# Patient Record
Sex: MALE | Race: WHITE | NOT HISPANIC OR LATINO | Employment: UNEMPLOYED | URBAN - METROPOLITAN AREA
[De-identification: names, ages, dates, MRNs, and addresses within clinical notes are randomized per-mention and may not be internally consistent; named-entity substitution may affect disease eponyms.]

---

## 2017-02-18 ENCOUNTER — OFFICE VISIT (OUTPATIENT)
Dept: URGENT CARE | Facility: URGENT CARE | Age: 22
End: 2017-02-18
Payer: COMMERCIAL

## 2017-02-18 VITALS
OXYGEN SATURATION: 99 % | WEIGHT: 141.6 LBS | HEART RATE: 79 BPM | SYSTOLIC BLOOD PRESSURE: 124 MMHG | DIASTOLIC BLOOD PRESSURE: 63 MMHG

## 2017-02-18 DIAGNOSIS — H65.192 OTHER ACUTE NONSUPPURATIVE OTITIS MEDIA OF LEFT EAR, RECURRENCE NOT SPECIFIED: Primary | ICD-10-CM

## 2017-02-18 DIAGNOSIS — M22.2X2 PATELLOFEMORAL STRESS SYNDROME OF LEFT KNEE: ICD-10-CM

## 2017-02-18 DIAGNOSIS — M76.62 ACHILLES TENDINITIS OF LEFT LOWER EXTREMITY: ICD-10-CM

## 2017-02-18 PROCEDURE — 99214 OFFICE O/P EST MOD 30 MIN: CPT | Performed by: FAMILY MEDICINE

## 2017-02-18 RX ORDER — AMOXICILLIN 875 MG
875 TABLET ORAL 2 TIMES DAILY
Qty: 20 TABLET | Refills: 0 | Status: SHIPPED | OUTPATIENT
Start: 2017-02-18 | End: 2017-02-28

## 2017-02-18 ASSESSMENT — PAIN SCALES - GENERAL: PAINLEVEL: MODERATE PAIN (4)

## 2017-02-18 NOTE — PATIENT INSTRUCTIONS
Knee Pain of Uncertain Cause    There are several common causes for knee pain. These can include:    A sprain of the ligaments that support the joint    An injury to the cartilage lining of the joint    Arthritis from wear-and-tear or inflammation  There are other causes as well. There may also be swelling, reduced movement of the knee joint, and pain with walking. A definite diagnosis will still need to be made. If your symptoms do not improve, further follow-up and testing may be needed.  Home care    Stay off the injured leg as much as possible until pain improves.    Apply an ice pack over the injured area for 15 to 20 minutes every 3 to 6 hours. You should do this for the first 24 to 48 hours. You can make an ice pack by filling a plastic bag that seals at the top with ice cubes and then wrapping it with a thin towel. Continue to use ice packs for relief of pain and swelling as needed. As the ice melts, be careful to avoid getting your wrap, splint, or cast wet. After 48 hours, apply heat (warm shower or warm bath) for 15 to 20 minutes several times a day, or alternate ice and heat. If you have to wear a hook-and-loop knee brace, you can open it to apply the ice pack, or heat, directly to the knee. Never put ice directly on the skin. Always wrap the ice in a towel or other type of cloth.    You may use over-the-counter pain medicine to control pain, unless another pain medicine was prescribed. If you have chronic liver or kidney disease or ever had a stomach ulcer or GI bleeding, talk with your healthcare provider using these medicines.    If crutches or a walker have been recommended, do not put weight on the injured leg until you can do so without pain. Check with your healthcare provider before returning to sports or full work duties.    If you have a hook-and-loop knee brace, you can remove it to bathe and sleep, unless told otherwise.  Follow-up care  Follow up with your healthcare provider as advised.  This is usually within 1-2 weeks.  If X-rays were taken, you will be told of any new findings that may affect your care.  Call 911  Call 911 if you have:     Shortness of breath    Chest pain  When to seek medical advice  Call your healthcare provider right away if any of these occur:    Toes or foot becomes swollen, cold, blue, numb, or tingly    Pain or swelling spreads over the knee or calf    Warmth or redness appears over the knee or calf    Other joints become painful    Rash appears    Fever of 100.4 F (38 C) or above lasting for 24 to 48 hours    2039-7408 [employer]. [address] [place], [place] [address]. All rights reserved. This information is not intended as a substitute for professional medical care. Always follow your healthcare professional's instructions.

## 2017-02-18 NOTE — PROGRESS NOTES
SUBJECTIVE:                                                    Cliff Terrzaas is a 21 year old male who presents to clinic today for the following health issues:      Musculoskeletal problem/pain      Duration: [id number]    Description  Location: Left pain in ankle and knee, hurts the most when he wakes up 8/10    Intensity:  4/10    Accompanying signs and symptoms: swelling    History  Previous similar problem: no   Previous evaluation:  none    Precipitating or alleviating factors:  Trauma or overuse: YES  Aggravating factors include: lifting    Therapies tried and outcome: rest/inactivity, heat, ice, immobilization, stretching and NSAID -      A month ago when he was playing basketball or with lifting he's been increasing his activities lately started noticing more pain  Ankle really is what's bothering him  The knee stiffness is more than pain and having hard time putting more weight on    Still continues to work out but cut down on leg workouts in the past month  Still playing basketball and skating.    Really sore in the morning and seems to get better throughout the day.     Also complained of a mild left ear pain  Patient has been coughing up some phlegm past 3-4 days  No fevers or chills chest pain or shortness of breath   Some headache on and off   These uri signs or symptoms are mild and patient just thought to mention while he's here  Problem list and histories reviewed & adjusted, as indicated.  Additional history: as documented    Problem list, Medication list, Allergies, and Medical/Social/Surgical histories reviewed in EPIC and updated as appropriate.    ROS:  Constitutional, HEENT, cardiovascular, pulmonary, gi and gu systems are negative, except as otherwise noted.    OBJECTIVE:                                                    /63 (BP Location: Left arm, Patient Position: Chair, Cuff Size: Adult Regular)  Pulse 79  Wt 141 lb 9.6 oz (64.2 kg)  SpO2 99%  There is no height or weight  on file to calculate BMI.  GENERAL: healthy, alert and no distress  EYES: pink palpebral conjunctiva, anicteric sclera, pupils equally reactive to light and accomodation, extraocular muscles intact full and equal.  ENT: midline nasal septum, positive  nasal congestion   Left ear:no tragal tenderness, no mastoid tenderness erythematous and bulging tympaninc membrane   Right ear: no tragal tenderness, no mastoid tenderness normal tympaninc membrane   NECK: no adenopathy, no asymmetry or  masses  RESP: lungs clear to auscultation - no rales, rhonchi or wheezes  CV: regular rate and rhythm, normal S1 S2, no S3 or S4, no murmur, click or rub, no peripheral edema and peripheral pulses strong  ABDOMEN: soft, nontender, no hepatosplenomegaly, no masses and bowel sounds normal  MS: no gross musculoskeletal defects noted, no edema  Tender left achilles tendon but appears intact   Ankle Exam (left):  Inspection:no swelling seen  Palpation: no tenderness on navicular bone or 5th metatarsal. No proximal fibular tenderness. No calf tenderness. Achilles tendon is intact  Cap refill intact.    Good doralis pedis.  Neurovascularly Intact Distally.   Left knee: good range of motion. Tender anterior patellar tendon area positive apprehension test  Cruciate and collateral ligament intact negative jued  NEURO: Normal strength and tone, mentation intact and speech normal    Diagnostic Test Results:  No results found for this or any previous visit (from the past 24 hour(s)).     ASSESSMENT/PLAN:                                                      No diagnosis found.    ICD-10-CM    1. Other acute nonsuppurative otitis media of left ear, recurrence not specified H65.192 amoxicillin (AMOXIL) 875 MG tablet   2. Achilles tendinitis of left lower extremity M76.62 ORTHO  REFERRAL   3. Patellofemoral stress syndrome of left knee M22.2X2 ORTHO  REFERRAL       Recommend follow up with primary care provider if no relief of  ear pain in 3 days, sooner if worse  Needs ear recheck with primary care provider in 2-4 weeks  Adverse reactions of medications discussed.  Over the counter medications discussed.   Aware to come back in if with worsening symptoms or if no relief despite treatment plan  Patient voiced understanding and had no further questions.     ASO brace  Activity modification  See patient instructions  Also given handout from [employer]  Follow up ortho   Alarm signs or symptoms discussed, if present recommend go to ER     MD Yolanda Uribe MD  [facility]

## 2017-02-18 NOTE — MR AVS SNAPSHOT
After Visit Summary   2/18/2017    Cliff Terrazas    MRN: [id number]           Patient Information     Date Of Birth          1995        Visit Information        Provider Department      2/18/2017 1:30 PM Yolanda King MD [facility]        Today's Diagnoses     Other acute nonsuppurative otitis media of left ear, recurrence not specified    -  1    Achilles tendinitis of left lower extremity        Patellofemoral stress syndrome of left knee          Care Instructions      Knee Pain of Uncertain Cause    There are several common causes for knee pain. These can include:    A sprain of the ligaments that support the joint    An injury to the cartilage lining of the joint    Arthritis from wear-and-tear or inflammation  There are other causes as well. There may also be swelling, reduced movement of the knee joint, and pain with walking. A definite diagnosis will still need to be made. If your symptoms do not improve, further follow-up and testing may be needed.  Home care    Stay off the injured leg as much as possible until pain improves.    Apply an ice pack over the injured area for 15 to 20 minutes every 3 to 6 hours. You should do this for the first 24 to 48 hours. You can make an ice pack by filling a plastic bag that seals at the top with ice cubes and then wrapping it with a thin towel. Continue to use ice packs for relief of pain and swelling as needed. As the ice melts, be careful to avoid getting your wrap, splint, or cast wet. After 48 hours, apply heat (warm shower or warm bath) for 15 to 20 minutes several times a day, or alternate ice and heat. If you have to wear a hook-and-loop knee brace, you can open it to apply the ice pack, or heat, directly to the knee. Never put ice directly on the skin. Always wrap the ice in a towel or other type of cloth.    You may use over-the-counter pain medicine to control pain, unless another pain medicine was  prescribed. If you have chronic liver or kidney disease or ever had a stomach ulcer or GI bleeding, talk with your healthcare provider using these medicines.    If crutches or a walker have been recommended, do not put weight on the injured leg until you can do so without pain. Check with your healthcare provider before returning to sports or full work duties.    If you have a hook-and-loop knee brace, you can remove it to bathe and sleep, unless told otherwise.  Follow-up care  Follow up with your healthcare provider as advised. This is usually within 1-2 weeks.  If X-rays were taken, you will be told of any new findings that may affect your care.  Call 911  Call 911 if you have:     Shortness of breath    Chest pain  When to seek medical advice  Call your healthcare provider right away if any of these occur:    Toes or foot becomes swollen, cold, blue, numb, or tingly    Pain or swelling spreads over the knee or calf    Warmth or redness appears over the knee or calf    Other joints become painful    Rash appears    Fever of 100.4 F (38 C) or above lasting for 24 to 48 hours    9933-4017 [employer] [address] [place] [address]. All rights reserved. This information is not intended as a substitute for professional medical care. Always follow your healthcare professional's instructions.              Follow-ups after your visit        Additional Services     ORTHO  REFERRAL       [facility] is referring you to the Orthopedic  Services at [facility].       The  Representative will assist you in the coordination of your Orthopedic and Musculoskeletal Care as prescribed by your physician.    The  Representative will call you within 1 business day to help schedule your appointment, or you may contact the  Representative at:    All areas ~ [phone]     Type of Referral : Non Surgical       Timeframe  "requested: Within 2 weeks    Coverage of these services is subject to the terms and limitations of your health insurance plan.  Please call member services at your health plan with any benefit or coverage questions.      If X-rays, CT or MRI's have been performed, please contact the facility where they were done to arrange for , prior to your scheduled appointment.  Please bring this referral request to your appointment and present it to your specialist.                  Who to contact     If you have questions or need follow up information about today's clinic visit or your schedule please contact [facility] directly at [phone].  Normal or non-critical lab and imaging results will be communicated to you by MyChart, letter or phone within 4 business days after the clinic has received the results. If you do not hear from us within 7 days, please contact the clinic through [employer] or phone. If you have a critical or abnormal lab result, we will notify you by phone as soon as possible.  Submit refill requests through [employer] or call your pharmacy and they will forward the refill request to us. Please allow 3 business days for your refill to be completed.          Additional Information About Your Visit        MyChart Information     [employer] lets you send messages to your doctor, view your test results, renew your prescriptions, schedule appointments and more. To sign up, go to [email or web address] . Click on \"Log in\" on the left side of the screen, which will take you to the Welcome page. Then click on \"Sign up Now\" on the right side of the page.     You will be asked to enter the access code listed below, as well as some personal information. Please follow the directions to create your username and password.     Your access code is: [email or web address]  Expires: 2017  3:13 PM     Your access code will  in 90 days. If you need help or a new code, please call your [facility] or " [phone].        Care EveryWhere ID     This is your Care EveryWhere ID. This could be used by other organizations to access your [place] medical records  [id number]        Your Vitals Were     Pulse Pulse Oximetry                79 99%           Blood Pressure from Last 3 Encounters:   02/18/17 124/63   07/23/12 102/68   04/19/12 117/67    Weight from Last 3 Encounters:   02/18/17 141 lb 9.6 oz (64.2 kg) (27 %)*   07/23/12 109 lb (49.4 kg) (18 %)*   04/19/12 110 lb (49.9 kg) (24 %)*     * Growth percentiles are based on [facility] 2-20 Years data.              We Performed the Following     ORTHO  REFERRAL          Today's Medication Changes          These changes are accurate as of: 2/18/17  3:13 PM.  If you have any questions, ask your nurse or doctor.               Start taking these medicines.        Dose/Directions    amoxicillin 875 MG tablet   Commonly known as:  AMOXIL   Used for:  Other acute nonsuppurative otitis media of left ear, recurrence not specified   Started by:  Yolanda King MD        Dose:  875 mg   Take 1 tablet (875 mg) by mouth 2 times daily for 10 days   Quantity:  20 tablet   Refills:  0            Where to get your medicines      These medications were sent to [facility]/pharmacy #[id number] - [address],  AT [address] OF [address], , [facility] [address]     Phone:  [phone]     amoxicillin 875 MG tablet                Primary Care Provider Office Phone # Fax #    Agustín Alatorre -[phone]       [facility] [address] [facility] [address]        Thank you!     Thank you for choosing [facility]  for your care. Our goal is always to provide you with excellent care. Hearing back from our patients is one way we can continue to improve our services. Please take a few minutes to complete the written survey that you may receive in the mail after your visit with us.  Thank you!             Your Updated Medication List - Protect others around you: Learn how to safely use, store and throw away your medicines at www.disposemymeds.org.          This list is accurate as of: 2/18/17  3:13 PM.  Always use your most recent med list.                   Brand Name Dispense Instructions for use    amoxicillin 875 MG tablet    AMOXIL    20 tablet    Take 1 tablet (875 mg) by mouth 2 times daily for 10 days       ibuprofen 200 MG tablet    ADVIL/MOTRIN     Take 400 mg by mouth every 4 hours as needed Reported on 2/18/2017

## 2017-02-18 NOTE — NURSING NOTE
"Chief Complaint   Patient presents with     Knee Pain     knee and ankle pain X1mo hurt it during basketball. Hurts the most when he wakes up     Otalgia     left ear pain X1day.        Initial /63 (BP Location: Left arm, Patient Position: Chair, Cuff Size: Adult Regular)  Pulse 79  Wt 141 lb 9.6 oz (64.2 kg)  SpO2 99% Estimated body mass index is 17.59 kg/(m^2) as calculated from the following:    Height as of 7/23/12: 5' 6\" (1.676 m).    Weight as of 7/23/12: 109 lb (49.4 kg).  Medication Reconciliation: incomplete   Sarai Moulton MA      "

## 2017-02-21 ENCOUNTER — OFFICE VISIT (OUTPATIENT)
Dept: ORTHOPEDICS | Facility: CLINIC | Age: 22
End: 2017-02-21
Payer: COMMERCIAL

## 2017-02-21 VITALS
SYSTOLIC BLOOD PRESSURE: 124 MMHG | WEIGHT: 157 LBS | DIASTOLIC BLOOD PRESSURE: 56 MMHG | HEART RATE: 79 BPM | HEIGHT: 66 IN | BODY MASS INDEX: 25.23 KG/M2

## 2017-02-21 DIAGNOSIS — M76.62 LEFT ACHILLES TENDINITIS: ICD-10-CM

## 2017-02-21 DIAGNOSIS — M76.52 PATELLAR TENDINITIS OF LEFT KNEE: Primary | ICD-10-CM

## 2017-02-21 PROCEDURE — 97110 THERAPEUTIC EXERCISES: CPT | Mod: GP | Performed by: PHYSICAL MEDICINE & REHABILITATION

## 2017-02-21 PROCEDURE — 99244 OFF/OP CNSLTJ NEW/EST MOD 40: CPT | Mod: 25 | Performed by: PHYSICAL MEDICINE & REHABILITATION

## 2017-02-21 RX ORDER — MELOXICAM 15 MG/1
15 TABLET ORAL DAILY
Qty: 30 TABLET | Refills: 0 | Status: SHIPPED | OUTPATIENT
Start: 2017-02-21 | End: 2017-03-31

## 2017-02-21 NOTE — MR AVS SNAPSHOT
After Visit Summary   2/21/2017    Cliff Terrazas    MRN: [id number]           Patient Information     Date Of Birth          1995        Visit Information        Provider Department      2/21/2017 7:00 PM Aspen Conde MD [facility]        Today's Diagnoses     Patellar tendinitis of left knee    -  1    Left Achilles tendinitis          Care Instructions    -Meloxicam ordered.  Take 15 mg daily.  Please take this medication with food.  DO NOT take any other nonsteroidal anti-inflammatory drugs (NSAIDs) such as Advil, ibuprofen, Aleve, naproxen, etc while on this medication.  -Recommend utilizing the Chopat strap during activity for the patellar tendinitis.  -Recommend wearing the night splint for the Achilles tendinitis.    -Please ice massage the patellar tendon and Achilles tendon for 15 minutes at the end of the day.  -Tylenol as needed for breakthrough pain.  Maximum 3000 mg in 24 hours.  -Home exercises provided.  Please do the exercises 5-6 days per week.  -Avoid activities that aggravate the pain.    -Follow up in one month.  If no better at that point, we will consider formal physical therapy.          Follow-ups after your visit        Who to contact     If you have questions or need follow up information about today's clinic visit or your schedule please contact [facility] directly at [phone].  Normal or non-critical lab and imaging results will be communicated to you by MyChart, letter or phone within 4 business days after the clinic has received the results. If you do not hear from us within 7 days, please contact the clinic through MyChart or phone. If you have a critical or abnormal lab result, we will notify you by phone as soon as possible.  Submit refill requests through [employer] or call your pharmacy and they will forward the refill request to us. Please allow 3 business days for your  "refill to be completed.          Additional Information About Your Visit        [employer] Information     [employer] lets you send messages to your doctor, view your test results, renew your prescriptions, schedule appointments and more. To sign up, go to [email or web address] . Click on \"Log in\" on the left side of the screen, which will take you to the Welcome page. Then click on \"Sign up Now\" on the right side of the page.     You will be asked to enter the access code listed below, as well as some personal information. Please follow the directions to create your username and password.     Your access code is: [email or web address]  Expires: 2017  3:13 PM     Your access code will  in 90 days. If you need help or a new code, please call your [place] clinic or [phone].        Care EveryWhere ID     This is your Care EveryWhere ID. This could be used by other organizations to access your [place] medical records  [id number]        Your Vitals Were     Pulse Height BMI (Body Mass Index)             79 5' 6\" (1.676 m) 25.34 kg/m2          Blood Pressure from Last 3 Encounters:   17 124/56   17 124/63   12 102/68    Weight from Last 3 Encounters:   17 157 lb (71.2 kg) (53 %)*   17 141 lb 9.6 oz (64.2 kg) (27 %)*   12 109 lb (49.4 kg) (18 %)*     * Growth percentiles are based on CDC 2-20 Years data.              Today, you had the following     No orders found for display         Today's Medication Changes          These changes are accurate as of: 17  7:54 PM.  If you have any questions, ask your nurse or doctor.               Start taking these medicines.        Dose/Directions    meloxicam 15 MG tablet   Commonly known as:  MOBIC   Used for:  Patellar tendinitis of left knee, Left Achilles tendinitis        Dose:  15 mg   Take 1 tablet (15 mg) by mouth daily   Quantity:  30 tablet   Refills:  0            Where to get your medicines      These medications were sent to " [facility]/pharmacy [id number] - [place], [place] - [address] [facility] AT [address] OF [facility]  [address] [facility] [address], [facility] [address]     Phone:  [phone]     meloxicam 15 MG tablet                Primary Care Provider Office Phone # Fax #    Agustín Alatorre -[phone]       [facility] [address] [facility] [address]        Thank you!     Thank you for choosing [facility]  for your care. Our goal is always to provide you with excellent care. Hearing back from our patients is one way we can continue to improve our services. Please take a few minutes to complete the written survey that you may receive in the mail after your visit with us. Thank you!             Your Updated Medication List - Protect others around you: Learn how to safely use, store and throw away your medicines at www.disposemymeds.org.          This list is accurate as of: 2/21/17  7:54 PM.  Always use your most recent med list.                   Brand Name Dispense Instructions for use    amoxicillin 875 MG tablet    AMOXIL    20 tablet    Take 1 tablet (875 mg) by mouth 2 times daily for 10 days       ibuprofen 200 MG tablet    ADVIL/MOTRIN     Take 400 mg by mouth every 4 hours as needed Reported on 2/18/2017       meloxicam 15 MG tablet    MOBIC    30 tablet    Take 1 tablet (15 mg) by mouth daily

## 2017-02-22 NOTE — NURSING NOTE
"Chief Complaint   Patient presents with     Leg Pain     left achilles       Initial /56  Pulse 79  Ht 5' 6\" (1.676 m)  Wt 157 lb (71.2 kg)  BMI 25.34 kg/m2 Estimated body mass index is 25.34 kg/(m^2) as calculated from the following:    Height as of this encounter: 5' 6\" (1.676 m).    Weight as of this encounter: 157 lb (71.2 kg).  Medication Reconciliation: complete     Lorie Ruelas M.Ed., ATC      "

## 2017-02-22 NOTE — NURSING NOTE
Patient was given home exercise program at the [facility]  that included the following exercise(s) :    Exercise(s) active supine quad stretch off table, prone quad stretch, hip flexor lunge stretch, supine across the body glute stretch, seated kne to chest glute stretch, seated external rotation glute stretch with trunk flexion    Repititions: 10 seconds x 6    Times per day: 1-2 times per day    Patient demonstrated understanding of and the ability to perform these exercises. Patient was seen for 10 minutes to provide this home exercise program. Patient was directed to discontinue any exercises that cause pain, and to call the clinic if any questions. All patient questions were answered.

## 2017-02-22 NOTE — PROGRESS NOTES
Sports Medicine Clinic Visit    PCP: Agustín Alatorre    CC: Patient presents with:  Leg Pain: left achilles      HPI:  Cliff Terrazas is a 21 year old male who is seen in consultation at the request of Yolanda King MD. He notes pain that began 1 month ago, insidious onset. Pain is located on the left knee, inferior pole of the patella. He rates the pain at a 8/10 at its worst and a 6/10 currently. Symptoms are relieved with ice, ibuprofen and elevation minimally and worsened by flexion, running, jumping, sitting for long periods of time, stairs, activity and walking. He endorses pain and denies swelling, bruising, popping, grinding, catching, locking, instability, numbness, tingling, weakness, pain in other joints and fever, chills.  Other treatment has included rest. He notes difficulty with participating in all his activities. He plays basketball, boxes, lifts weights, and skateboards. He has been resting completely for 1 week.    He also notes left achilles pain. This pain began before the knee pain. He reports pain and stiffness in the morning. He is having pain with walking. Symptoms are relieved with ice and elevation. He has also been wearing an ankle brace for the past 2 days which seems to help the achilles but worsens the knee.    Review of Systems:  Musculoskeletal: as above  Remainder of review of systems is negative including constitutional, eyes, ENT, CV, pulmonary, GI, , endocrine, skin, hematologic, and neurologic except as noted in HPI or medical history.    History reviewed. No pertinent past surgical/medical/family/social history.    Past Medical History   Diagnosis Date     NO ACTIVE PROBLEMS      Past Surgical History   Procedure Laterality Date     Orthopedic surgery  6/1/2007     both wrist     Family History   Problem Relation Age of Onset     Circulatory Paternal Grandmother      anurysym     Social History     Social History     Marital status: Single     Spouse name: N/A  "    Number of children: N/A     Years of education: N/A     Occupational History     Not on file.     Social History Main Topics     Smoking status: Never Smoker     Smokeless tobacco: Never Used      Comment: parents smoke outside the home     Alcohol use No     Drug use: No     Sexual activity: No     Other Topics Concern     Not on file     Social History Narrative       He is attending [employer] studying welding and he also works at [employer].  At baseline, he does not need assistance with ambulation.        Current Outpatient Prescriptions   Medication     meloxicam (MOBIC) 15 MG tablet     amoxicillin (AMOXIL) 875 MG tablet     ibuprofen (ADVIL,MOTRIN) 200 MG tablet     No current facility-administered medications for this visit.      No Known Allergies      Objective:  /56  Pulse 79  Ht 5' 6\" (1.676 m)  Wt 157 lb (71.2 kg)  BMI 25.34 kg/m2    General: healthy, alert and in no distress    Head: Normocephalic, atraumatic  Eyes: no scleral icterus or conjunctival erythema   Oropharynx:  Mucous membranes moist  Skin: no erythema, ecchymosis, petechiae, or jaundice  CV: regular rhythm by palpation, 2+ distal pulses, no pedal edema    Resp: normal respiratory effort without conversational dyspnea   Psych: normal mood and affect    Gait: Non-antalgic, appropriate coordination and balance   Neuro: normal light touch sensory exam of the extremities. Motor strength as noted below    Musculoskeletal:    Bilateral Knee exam    Inspection:  No erythema, ecchymosis, warmth, or edema    Palpation: Tender to palpation over the left patellar tendon and suprapatellar bursa    Knee ROM: Full active and passive ROM - terminal knee flexion and terminal knee extension are somewhat painful on the left    Hip ROM: Full active and passive ROM without hip ain    Patellar Motion:      Normal patellar tracking noted through range of motion    Strength:  5/5 Hip flexion/abduction/adduction, knee extension/flexion, ankle " plantarflexion/dorsiflexion, ankle inversion/eversion, great toe extension, toe flexion    Special Tests: Negative anterior/posterior drawer, negative Lachman's, no varus/valgus instability at 0 and 30 degrees, Ganesh's test positive for pain at the left patellar tendon but not in the medial or lateral joint line    Bilateral Foot and Ankle Exam:    Inspection:       no visible ecchymosis       no visible edema or effusion    Palpation:  Tender to palpation over the left Achilles muscle belly and insertion.  Also tenderness at the left lateral malleolus.      ROM:        Full active and passive ROM, ankle dorsiflexion, plantarflexion, inversion, eversion, great toe dorsiflexion, remainder of toes, midfoot and subtalar bilaterally      Special Tests:       neg (-) anterior drawer bilaterally       neg (-) talar tilt bilaterally       neg (-)  Manuel test bilaterally    Neurovascular:       2+ peripheral pulses bilaterally and brisk capillary refill       sensation grossly intact      Radiology:  No imaging today    Assessment:  1. Patellar tendinitis of left knee    2. Left Achilles tendinitis        Plan:  Discussed the assessment with the patient. We discussed the following treatment options: symptom treatment, activity modification/rest, imaging and rehab. Following discussion, plan:  -Meloxicam ordered.  Take 15 mg daily.  Please take this medication with food.  DO NOT take any other nonsteroidal anti-inflammatory drugs (NSAIDs) such as Advil, ibuprofen, Aleve, naproxen, etc while on this medication.  -Recommend utilizing the Chopat strap during activity for the patellar tendinitis.  -Recommend wearing the night splint for the Achilles tendinitis.  -Please ice massage the patellar tendon and Achilles tendon for 15 minutes at the end of the day.  -Tylenol as needed for breakthrough pain.  Maximum 3000 mg in 24 hours.  -Home exercises provided.  Please do the exercises 5-6 days per week.  -Avoid activities that  aggravate the pain.    -Follow up in one month.  If no better at that point, we will consider formal physical therapy.        Brittani Conde MD, CAQ  [facility]

## 2017-03-20 DIAGNOSIS — M76.52 PATELLAR TENDINITIS OF LEFT KNEE: ICD-10-CM

## 2017-03-20 DIAGNOSIS — M76.62 LEFT ACHILLES TENDINITIS: ICD-10-CM

## 2017-03-20 RX ORDER — MELOXICAM 15 MG/1
15 TABLET ORAL DAILY
Qty: 30 TABLET | Refills: 0 | OUTPATIENT
Start: 2017-03-20

## 2017-03-20 NOTE — TELEPHONE ENCOUNTER
meloxicam (MOBIC) 15 MG tablet      Last Written Prescription Date: 02/21/17  Last Quantity: 30, # refills: 0  Last Office Visit with [facility], [facility] or [facility] prescribing provider: 02/21/17       Creatinine   Date Value Ref Range Status   06/01/2007 0.43 0.40 - 1.00 mg/dL Final     No results found for: AST  No results found for: ALT  BP Readings from Last 3 Encounters:   02/21/17 124/56   02/18/17 124/63   07/23/12 102/68

## 2017-03-31 ENCOUNTER — OFFICE VISIT (OUTPATIENT)
Dept: ORTHOPEDICS | Facility: OTHER | Age: 22
End: 2017-03-31
Payer: COMMERCIAL

## 2017-03-31 VITALS — BODY MASS INDEX: 25.23 KG/M2 | HEIGHT: 66 IN | RESPIRATION RATE: 18 BRPM | WEIGHT: 157 LBS

## 2017-03-31 DIAGNOSIS — M76.52 PATELLAR TENDINITIS OF LEFT KNEE: Primary | ICD-10-CM

## 2017-03-31 DIAGNOSIS — M76.62 LEFT ACHILLES TENDINITIS: ICD-10-CM

## 2017-03-31 PROCEDURE — 99214 OFFICE O/P EST MOD 30 MIN: CPT | Performed by: PHYSICAL MEDICINE & REHABILITATION

## 2017-03-31 RX ORDER — MELOXICAM 15 MG/1
15 TABLET ORAL DAILY
Qty: 30 TABLET | Refills: 1 | Status: SHIPPED | OUTPATIENT
Start: 2017-03-31 | End: 2023-03-17

## 2017-03-31 NOTE — MR AVS SNAPSHOT
After Visit Summary   3/31/2017    Cliff Terrazas    MRN: [id number]           Patient Information     Date Of Birth          1995        Visit Information        Provider Department      3/31/2017 3:20 PM Aspen Conde MD [facility]        Today's Diagnoses     Patellar tendinitis of left knee    -  1    Left Achilles tendinitis          Care Instructions    Today's Plan of Care:  Rehab: Physical Therapy: [facility]- [phone]  Continue Chopat and ice massage  Please do 5-6 days per week of physical therapy between formal physical therapy sessions and at home exercises provided.  Tylenol as needed for breakthrough pain.  Refill of Meloxicam      Follow Up: 6weeks          Follow-ups after your visit        Additional Services     [facility] PT, HAND, AND CHIROPRACTIC REFERRAL       **This order will print in the [facility] Scheduling Office**    Physical Therapy, Hand Therapy and Chiropractic Care are available through:    *[facility]  *[facility]  *[facility]    Call one number to schedule at any of the above locations: [phone].    Your provider has referred you to:     Indication/Reason for Referral: Knee Pain  Onset of Illness: months  Therapy Orders: Evaluate and Treat  Special Programs:   Special Request:     Akash Bustos      Additional Comments for the Therapist or Chiropractor:     Please be aware that coverage of these services is subject to the terms and limitations of your health insurance plan.  Call member services at your health plan with any benefit or coverage questions.      Please bring the following to your appointment:    *Your personal calendar for scheduling future appointments  *Comfortable clothing                  Follow-up notes from your care team     Return in about 6 weeks (around 5/12/2017).      Who to contact     If you have questions or need follow up information about today's  "clinic visit or your schedule please contact [facility] directly at [phone].  Normal or non-critical lab and imaging results will be communicated to you by MyChart, letter or phone within 4 business days after the clinic has received the results. If you do not hear from us within 7 days, please contact the clinic through [facility] or phone. If you have a critical or abnormal lab result, we will notify you by phone as soon as possible.  Submit refill requests through [employer] or call your pharmacy and they will forward the refill request to us. Please allow 3 business days for your refill to be completed.          Additional Information About Your Visit        MyChart Information     [employer] lets you send messages to your doctor, view your test results, renew your prescriptions, schedule appointments and more. To sign up, go to [email or web address] . Click on \"Log in\" on the left side of the screen, which will take you to the Welcome page. Then click on \"Sign up Now\" on the right side of the page.     You will be asked to enter the access code listed below, as well as some personal information. Please follow the directions to create your username and password.     Your access code is: [email or web address]  Expires: 2017  4:13 PM     Your access code will  in 90 days. If you need help or a new code, please call your [facility] clinic or [phone].        Care EveryWhere ID     This is your Care EveryWhere ID. This could be used by other organizations to access your [facility] medical records  [id number]        Your Vitals Were     Respirations Height BMI (Body Mass Index)             18 5' 6\" (1.676 m) 25.34 kg/m2          Blood Pressure from Last 3 Encounters:   17 124/56   17 124/63   12 102/68    Weight from Last 3 Encounters:   17 157 lb (71.2 kg) (52 %)*   17 157 lb (71.2 kg) (53 %)*   17 141 lb 9.6 oz (64.2 kg) (27 %)*     * Growth percentiles are " based on CDC 2-20 Years data.              We Performed the Following     [facility] PT, HAND, AND CHIROPRACTIC REFERRAL        Primary Care Provider Office Phone # Fax #    Agustín Alatorre [phone]       [facility] [address] [address] [address]        Thank you!     Thank you for choosing [facility]  for your care. Our goal is always to provide you with excellent care. Hearing back from our patients is one way we can continue to improve our services. Please take a few minutes to complete the written survey that you may receive in the mail after your visit with us. Thank you!             Your Updated Medication List - Protect others around you: Learn how to safely use, store and throw away your medicines at www.disposemymeds.org.          This list is accurate as of: 3/31/17  3:22 PM.  Always use your most recent med list.                   Brand Name Dispense Instructions for use    ibuprofen 200 MG tablet    ADVIL/MOTRIN     Take 400 mg by mouth every 4 hours as needed Reported on 2/18/2017       meloxicam 15 MG tablet    MOBIC    30 tablet    Take 1 tablet (15 mg) by mouth daily       * order for DME     1 Device    Equipment being ordered: plantar fasciitis night splint - LG       * order for DME     1 Device    Equipment being ordered: Rachell mathew MD       * Notice:  This list has 2 medication(s) that are the same as other medications prescribed for you. Read the directions carefully, and ask your doctor or other care provider to review them with you.

## 2017-03-31 NOTE — PATIENT INSTRUCTIONS
Today's Plan of Care:  Rehab: Physical Therapy: [facility]- [phone]  Continue Chopat and ice massage  Please do 5-6 days per week of physical therapy between formal physical therapy sessions and at home exercises provided.  Tylenol as needed for breakthrough pain.  Refill of Meloxicam      Follow Up: 6weeks

## 2017-03-31 NOTE — PROGRESS NOTES
"Sports Medicine Clinic Visit - Interim History March 31, 2017    Initial Visit Date 2/21/17    PCP: Agustín Alatorre Jose Terrazas is a 21 year old male who is seen in f/u up for left knee and Achilles pain. Since last visit on 2/21/17 patient has noted improvement in his Achiilles, but notes his knee still hurts.  He rates the pain at a 4/10 currently.  He is unsure what relieves the pain - he says it hurts sometimes and gets better.      Review of Systems  Musculoskeletal: as above  Remainder of review of systems is negative including constitutional, eyes, ENT, CV, pulmonary, GI, , endocrine, skin, hematologic, and neurologic except as noted in HPI or medical history.    History reviewed. No pertinent past surgical/medical/family/social history.    Past Medical History:   Diagnosis Date     NO ACTIVE PROBLEMS      Past Surgical History:   Procedure Laterality Date     ORTHOPEDIC SURGERY  6/1/2007    both wrist     Family History   Problem Relation Age of Onset     Circulatory Paternal Grandmother      anurysym     Social History     Social History     Marital status: Single     Spouse name: N/A     Number of children: N/A     Years of education: N/A     Occupational History     Not on file.     Social History Main Topics     Smoking status: Never Smoker     Smokeless tobacco: Never Used      Comment: parents smoke outside the home     Alcohol use No     Drug use: No     Sexual activity: No     Other Topics Concern     Not on file     Social History Narrative       He works full time and is a full time student    Current Outpatient Prescriptions   Medication     meloxicam (MOBIC) 15 MG tablet     order for DME     order for DME     ibuprofen (ADVIL,MOTRIN) 200 MG tablet     No current facility-administered medications for this visit.      No Known Allergies      Objective:  Resp 18  Ht 5' 6\" (1.676 m)  Wt 157 lb (71.2 kg)  BMI 25.34 kg/m2    General: healthy, alert and in no distress    Head: " Normocephalic, atraumatic  Eyes: no scleral icterus or conjunctival erythema   Oropharynx:  Mucous membranes moist  Skin: no erythema, ecchymosis, petechiae, or jaundice  CV: regular rhythm by palpation, 2+ distal pulses, no pedal edema    Resp: normal respiratory effort without conversational dyspnea   Psych: normal mood and affect    Gait: Non-antalgic, appropriate coordination and balance   Neuro: normal light touch sensory exam of the extremities. Motor strength as noted below    Musculoskeletal:    Bilateral Knee and ankle exam    Inspection:  No erythema, ecchymosis, warmth, or edema    Palpation: Tenderness to palpation over the left patellar tendon and lateral inferior patellar facet.  No tenderness over the left Achilles.      Knee ROM: Five degree active extension lag on the left, passive is full.  Flexion full and pain free.    Hip ROM: Full passive ROM without pain    Patellar Motion:      Normal patellar tracking noted through range of motion    Strength:  5/5 Hip flexion/abduction/adduction, knee extension/flexion, ankle plantarflexion/dorsiflexion, great toe extension, toe flexion    Special Tests: Negative log roll, negative Jesus's compression test, negative anterior/posterior drawer, negative Lachman's, no varus/valgus instability at 0 and 30 degrees, Ganesh's test negative for pain or popping at the lateral/medial joint line.  Positive patellar apprehension test on the left.        Radiology:  No imaging today    Assessment:  1. Patellar tendinitis of left knee    2. Left Achilles tendinitis        Plan:  Discussed the assessment with the patient. We discussed the following treatment options: symptom treatment, activity modification/rest, imaging and rehab. Following discussion, plan:  -Ordered physical therapy in [place].  Please do 5-6 days of exercises per week between formal sessions and the home exercises they provide.  -Meloxicam refilled. Take 15 mg daily. Please take this medication with  food. DO NOT take any other nonsteroidal anti-inflammatory drugs (NSAIDs) such as Advil, ibuprofen, Aleve, naproxen, etc while on this medication.  -Continue utilizing the Chopat strap during activity for the patellar tendinitis.  -Ice massage the patellar tendon 15 minutes at the end of the day.  -Tylenol as needed for breakthrough pain. Maximum 3000 mg in 24 hours.  -Avoid activities that aggravate the pain.   -Follow up in 6 weeks or sooner if symptoms fail to improve or worsen.      Brittani Conde MD, CAQ  [facility]

## 2023-03-14 ENCOUNTER — TELEPHONE (OUTPATIENT)
Dept: FAMILY MEDICINE | Facility: CLINIC | Age: 28
End: 2023-03-14

## 2023-03-14 NOTE — TELEPHONE ENCOUNTER
Pt needs a high impact physical. 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  He went to [facility] and a different clinic and they said they only did sports physicals not high impact sports physicals.    Advised I would speak with providers here and if needed at our sports medicine department to see where he should be seen and then call him back wed. Pt agrees to plan.  Kavita PELAEZN, RN

## 2023-03-15 NOTE — TELEPHONE ENCOUNTER
Discussed with Pratik ALEJO who advises he can do this.  Ok to double book this week at 9 am slot on his schedule or use ABRIL slot if available.  He prefers that pt not be put in virtual slot.    Left message on pt vm to return our call and schedule with Pratik ALEJO.  Pt should bring form with him.    Kavita PELAEZN, RN

## 2023-03-17 ENCOUNTER — OFFICE VISIT (OUTPATIENT)
Dept: FAMILY MEDICINE | Facility: CLINIC | Age: 28
End: 2023-03-17

## 2023-03-17 VITALS
HEIGHT: 69 IN | BODY MASS INDEX: 20.73 KG/M2 | WEIGHT: 140 LBS | TEMPERATURE: 97 F | DIASTOLIC BLOOD PRESSURE: 75 MMHG | OXYGEN SATURATION: 98 % | HEART RATE: 66 BPM | RESPIRATION RATE: 17 BRPM | SYSTOLIC BLOOD PRESSURE: 124 MMHG

## 2023-03-17 DIAGNOSIS — Z23 NEED FOR VACCINATION: ICD-10-CM

## 2023-03-17 DIAGNOSIS — Z00.00 ROUTINE GENERAL MEDICAL EXAMINATION AT A HEALTH CARE FACILITY: Primary | ICD-10-CM

## 2023-03-17 DIAGNOSIS — Z11.3 SCREEN FOR STD (SEXUALLY TRANSMITTED DISEASE): ICD-10-CM

## 2023-03-17 LAB
ALBUMIN SERPL-MCNC: 4.2 G/DL (ref 3.4–5)
ALP SERPL-CCNC: 57 U/L (ref 40–150)
ALT SERPL W P-5'-P-CCNC: 37 U/L (ref 0–70)
ANION GAP SERPL CALCULATED.3IONS-SCNC: 1 MMOL/L (ref 3–14)
AST SERPL W P-5'-P-CCNC: 32 U/L (ref 0–45)
BILIRUB SERPL-MCNC: 0.9 MG/DL (ref 0.2–1.3)
BUN SERPL-MCNC: 20 MG/DL (ref 7–30)
CALCIUM SERPL-MCNC: 9.2 MG/DL (ref 8.5–10.1)
CHLORIDE BLD-SCNC: 106 MMOL/L (ref 94–109)
CHOLEST SERPL-MCNC: 125 MG/DL
CO2 SERPL-SCNC: 31 MMOL/L (ref 20–32)
CREAT SERPL-MCNC: 1.02 MG/DL (ref 0.66–1.25)
FASTING STATUS PATIENT QL REPORTED: YES
GFR SERPL CREATININE-BSD FRML MDRD: >90 ML/MIN/1.73M2
GLUCOSE BLD-MCNC: 82 MG/DL (ref 70–99)
HCV AB SERPL QL IA: NONREACTIVE
HDLC SERPL-MCNC: 66 MG/DL
HIV 1+2 AB+HIV1 P24 AG SERPL QL IA: NONREACTIVE
LDLC SERPL CALC-MCNC: 46 MG/DL
NONHDLC SERPL-MCNC: 59 MG/DL
POTASSIUM BLD-SCNC: 3.9 MMOL/L (ref 3.4–5.3)
PROT SERPL-MCNC: 7.3 G/DL (ref 6.8–8.8)
SODIUM SERPL-SCNC: 138 MMOL/L (ref 133–144)
T PALLIDUM AB SER QL: NONREACTIVE
TRIGL SERPL-MCNC: 63 MG/DL

## 2023-03-17 PROCEDURE — 90746 HEPB VACCINE 3 DOSE ADULT IM: CPT | Performed by: PHYSICIAN ASSISTANT

## 2023-03-17 PROCEDURE — 80053 COMPREHEN METABOLIC PANEL: CPT | Performed by: PHYSICIAN ASSISTANT

## 2023-03-17 PROCEDURE — 80061 LIPID PANEL: CPT | Performed by: PHYSICIAN ASSISTANT

## 2023-03-17 PROCEDURE — 90471 IMMUNIZATION ADMIN: CPT | Performed by: PHYSICIAN ASSISTANT

## 2023-03-17 PROCEDURE — 86780 TREPONEMA PALLIDUM: CPT | Performed by: PHYSICIAN ASSISTANT

## 2023-03-17 PROCEDURE — 87389 HIV-1 AG W/HIV-1&-2 AB AG IA: CPT | Performed by: PHYSICIAN ASSISTANT

## 2023-03-17 PROCEDURE — 99385 PREV VISIT NEW AGE 18-39: CPT | Mod: 25 | Performed by: PHYSICIAN ASSISTANT

## 2023-03-17 PROCEDURE — 36415 COLL VENOUS BLD VENIPUNCTURE: CPT | Performed by: PHYSICIAN ASSISTANT

## 2023-03-17 PROCEDURE — 90715 TDAP VACCINE 7 YRS/> IM: CPT | Performed by: PHYSICIAN ASSISTANT

## 2023-03-17 PROCEDURE — 90472 IMMUNIZATION ADMIN EACH ADD: CPT | Performed by: PHYSICIAN ASSISTANT

## 2023-03-17 PROCEDURE — 86803 HEPATITIS C AB TEST: CPT | Performed by: PHYSICIAN ASSISTANT

## 2023-03-17 ASSESSMENT — ENCOUNTER SYMPTOMS
HEARTBURN: 0
COUGH: 0
FEVER: 0
CONSTIPATION: 0
NAUSEA: 0
PARESTHESIAS: 0
FREQUENCY: 0
HEMATURIA: 0
CHILLS: 0
ABDOMINAL PAIN: 0
SHORTNESS OF BREATH: 0
ARTHRALGIAS: 0
EYE PAIN: 0
DYSURIA: 0
WEAKNESS: 0
HEMATOCHEZIA: 0
SORE THROAT: 0
JOINT SWELLING: 0
NERVOUS/ANXIOUS: 0
HEADACHES: 0
DIARRHEA: 0
PALPITATIONS: 0
DIZZINESS: 0
MYALGIAS: 0

## 2023-03-17 ASSESSMENT — PAIN SCALES - GENERAL: PAINLEVEL: NO PAIN (0)

## 2023-03-17 NOTE — LETTER
March 21, 2023      Cliff Terrazas  [address] [place]  [place] [address]        Dear ,    We are writing to inform you of your test results.    Your test results fall within the expected range(s) or remain unchanged from previous results.  Please continue with current treatment plan.    Resulted Orders   HIV Antigen Antibody Combo   Result Value Ref Range    HIV Antigen Antibody Combo Nonreactive Nonreactive      Comment:      HIV-1 p24 Ag & HIV-1/HIV-2 Ab Not Detected   Hepatitis C Screen Reflex to HCV RNA Quant and Genotype   Result Value Ref Range    Hepatitis C Antibody Nonreactive Nonreactive    Narrative    Assay performance characteristics have not been established for newborns, infants, and children.   Treponema Abs w Reflex to RPR and Titer   Result Value Ref Range    Treponema Antibody Total Nonreactive Nonreactive   Lipid panel reflex to direct LDL Fasting   Result Value Ref Range    Cholesterol 125 <200 mg/dL    Triglycerides 63 <150 mg/dL    Direct Measure HDL 66 >=40 mg/dL    LDL Cholesterol Calculated 46 <=100 mg/dL    Non HDL Cholesterol 59 <130 mg/dL    Patient Fasting > 8hrs? Yes     Narrative    Cholesterol  Desirable:  <200 mg/dL    Triglycerides  Normal:  Less than 150 mg/dL  Borderline High:  150-199 mg/dL  High:  200-499 mg/dL  Very High:  Greater than or equal to 500 mg/dL    Direct Measure HDL  Female:  Greater than or equal to 50 mg/dL   Male:  Greater than or equal to 40 mg/dL    LDL Cholesterol  Desirable:  <100mg/dL  Above Desirable:  100-129 mg/dL   Borderline High:  130-159 mg/dL   High:  160-189 mg/dL   Very High:  >= 190 mg/dL    Non HDL Cholesterol  Desirable:  130 mg/dL  Above Desirable:  130-159 mg/dL  Borderline High:  160-189 mg/dL  High:  190-219 mg/dL  Very High:  Greater than or equal to 220 mg/dL   Comprehensive metabolic panel (BMP + Alb, Alk Phos, ALT, AST, Total. Bili, TP)   Result Value Ref Range    Sodium 138 133 - 144 mmol/L    Potassium 3.9 3.4  - 5.3 mmol/L    Chloride 106 94 - 109 mmol/L    Carbon Dioxide (CO2) 31 20 - 32 mmol/L    Anion Gap 1 (L) 3 - 14 mmol/L    Urea Nitrogen 20 7 - 30 mg/dL    Creatinine 1.02 0.66 - 1.25 mg/dL    Calcium 9.2 8.5 - 10.1 mg/dL    Glucose 82 70 - 99 mg/dL    Alkaline Phosphatase 57 40 - 150 U/L    AST 32 0 - 45 U/L    ALT 37 0 - 70 U/L    Protein Total 7.3 6.8 - 8.8 g/dL    Albumin 4.2 3.4 - 5.0 g/dL    Bilirubin Total 0.9 0.2 - 1.3 mg/dL    GFR Estimate >90 >60 mL/min/1.73m2      Comment:      eGFR calculated using 2021 CKD-EPI equation.       If you have any questions or concerns, please call the clinic at the number listed above.       Sincerely,      Pratik Mariano PA-C

## 2023-03-17 NOTE — PROGRESS NOTES
SUBJECTIVE:   CC: Cliff is an 27 year old who presents for preventative health visit.     Patient has been advised of split billing requirements and indicates understanding: Yes  Healthy Habits:     Getting at least 3 servings of Calcium per day:  Yes    Bi-annual eye exam:  NO    Dental care twice a year:  Yes    Sleep apnea or symptoms of sleep apnea:  None    Diet:  Regular (no restrictions)    Frequency of exercise:  6-7 days/week    Duration of exercise:  45-60 minutes    Taking medications regularly:  Yes    Medication side effects:  Not applicable    PHQ-2 Total Score: 0    Additional concerns today:  No    Needs forms completed for boxing    VISION   No corrective lenses  Tool used: Sharpe   Right eye:        10/12.5 (20/25)  Left eye:          10/12.5 (20/25)  Visual Acuity: Pass        Today's PHQ-2 Score:   PHQ-2 ( 1999 [employer]) 3/17/2023   Q1: Little interest or pleasure in doing things 0   Q2: Feeling down, depressed or hopeless 0   PHQ-2 Score 0   Q1: Little interest or pleasure in doing things Not at all   Q2: Feeling down, depressed or hopeless Not at all   PHQ-2 Score 0       Have you ever done Advance Care Planning? (For example, a Health Directive, POLST, or a discussion with a medical provider or your loved ones about your wishes): No, advance care planning information given to patient to review.  Patient declined advance care planning discussion at this time.    Social History     Tobacco Use     Smoking status: Never     Smokeless tobacco: Never     Tobacco comments:     parents smoke outside the home   Substance Use Topics     Alcohol use: No         Alcohol Use 3/17/2023   Prescreen: >3 drinks/day or >7 drinks/week? No       Last PSA: No results found for: PSA    Reviewed orders with patient. Reviewed health maintenance and updated orders accordingly - Yes  Lab work is in process  Labs reviewed in EPIC  BP Readings from Last 3 Encounters:   03/17/23 124/75   02/21/17 124/56   02/18/17  124/63    Wt Readings from Last 3 Encounters:   03/17/23 63.5 kg (140 lb)   03/31/17 71.2 kg (157 lb) (52 %, Z= 0.06)*   02/21/17 71.2 kg (157 lb) (53 %, Z= 0.07)*     * Growth percentiles are based on [facility] (Boys, 2-20 Years) data.                  Patient Active Problem List   Diagnosis     NO ACTIVE PROBLEMS     Past Surgical History:   Procedure Laterality Date     ORTHOPEDIC SURGERY  6/1/2007    both wrist       Social History     Tobacco Use     Smoking status: Never     Smokeless tobacco: Never     Tobacco comments:     parents smoke outside the home   Substance Use Topics     Alcohol use: No     Family History   Problem Relation Age of Onset     Circulatory Paternal Grandmother         anurysym         No current outpatient medications on file.     No Known Allergies    Reviewed and updated as needed this visit by clinical staff   Tobacco  Allergies  Meds  Problems  Med Hx  Surg Hx  Fam Hx          Reviewed and updated as needed this visit by Provider   Tobacco  Allergies  Meds  Problems  Med Hx  Surg Hx  Fam Hx           Past Medical History:   Diagnosis Date     NO ACTIVE PROBLEMS       Past Surgical History:   Procedure Laterality Date     ORTHOPEDIC SURGERY  6/1/2007    both wrist       Review of Systems   Constitutional: Negative for chills and fever.   HENT: Negative for congestion, ear pain, hearing loss and sore throat.    Eyes: Negative for pain and visual disturbance.   Respiratory: Negative for cough and shortness of breath.    Cardiovascular: Negative for chest pain, palpitations and peripheral edema.   Gastrointestinal: Negative for abdominal pain, constipation, diarrhea, heartburn, hematochezia and nausea.   Genitourinary: Negative for dysuria, frequency, genital sores, hematuria and urgency.   Musculoskeletal: Negative for arthralgias, joint swelling and myalgias.   Skin: Negative for rash.   Neurological: Negative for dizziness, weakness, headaches and paresthesias.  "  Psychiatric/Behavioral: Negative for mood changes. The patient is not nervous/anxious.        OBJECTIVE:   /75   Pulse 66   Temp 97  F (36.1  C) (Tympanic)   Resp 17   Ht 1.753 m (5' 9\")   Wt 63.5 kg (140 lb)   SpO2 98%   BMI 20.67 kg/m      Physical Exam  GENERAL: healthy, alert and no distress  EYES: Eyes grossly normal to inspection, PERRL and conjunctivae and sclerae normal  HENT: ear canals and TM's normal, nose and mouth without ulcers or lesions  NECK: no adenopathy, no asymmetry, masses, or scars and thyroid normal to palpation  RESP: lungs clear to auscultation - no rales, rhonchi or wheezes  CV: regular rate and rhythm, normal S1 S2, no S3 or S4, no murmur, click or rub, no peripheral edema and peripheral pulses strong  ABDOMEN: soft, nontender, no hepatosplenomegaly, no masses and bowel sounds normal   (male): normal male genitalia without lesions or urethral discharge, no hernia  MS: no gross musculoskeletal defects noted, no edema  SKIN: no suspicious lesions or rashes  NEURO: Normal strength and tone, mentation intact and speech normal  PSYCH: mentation appears normal, affect normal/bright    Diagnostic Test Results:  Labs reviewed in Epic    ASSESSMENT/PLAN:       ICD-10-CM    1. Routine general medical examination at a health care facility  Z00.00 Lipid panel reflex to direct LDL Fasting     Comprehensive metabolic panel (BMP + Alb, Alk Phos, ALT, AST, Total. Bili, TP)      2. Screen for STD (sexually transmitted disease)  Z11.3 HIV Antigen Antibody Combo     Hepatitis C Screen Reflex to HCV RNA Quant and Genotype     Chlamydia trachomatis PCR     Neisseria gonorrhoeae PCR     Treponema Abs w Reflex to RPR and Titer          COUNSELING:   Reviewed preventive health counseling, as reflected in patient instructions       Regular exercise       Healthy diet/nutrition        He reports that he has never smoked. He has never used smokeless tobacco.            MORGAN Camilo " [facility]

## 2023-03-17 NOTE — NURSING NOTE
Prior to immunization administration, verified patients identity using patient s name and date of birth. Please see Immunization Activity for additional information.     Screening Questionnaire for Adult Immunization    Are you sick today?   No   Do you have allergies to medications, food, a vaccine component or latex?   No   Have you ever had a serious reaction after receiving a vaccination?   No   Do you have a long-term health problem with heart, lung, kidney, or metabolic disease (e.g., diabetes), asthma, a blood disorder, no spleen, complement component deficiency, a cochlear implant, or a spinal fluid leak?  Are you on long-term aspirin therapy?   No   Do you have cancer, leukemia, HIV/AIDS, or any other immune system problem?   No   Do you have a parent, brother, or sister with an immune system problem?   No   In the past 3 months, have you taken medications that affect  your immune system, such as prednisone, other steroids, or anticancer drugs; drugs for the treatment of rheumatoid arthritis, Crohn s disease, or psoriasis; or have you had radiation treatments?   No   Have you had a seizure, or a brain or other nervous system problem?   No   During the past year, have you received a transfusion of blood or blood    products, or been given immune (gamma) globulin or antiviral drug?   No   For women: Are you pregnant or is there a chance you could become       pregnant during the next month?   No   Have you received any vaccinations in the past 4 weeks?   No     Immunization questionnaire answers were all negative.        Per orders of ADO, injection of tdap and Hep B given by Laura Doe CMA. Patient instructed to remain in clinic for 15 minutes afterwards, and to report any adverse reaction to me immediately.       Screening performed by Laura Doe CMA on 3/17/2023 at 9:31 AM.

## 2023-03-21 NOTE — RESULT ENCOUNTER NOTE
Mr. Terrazas,    All of your labs were normal/near normal for you.    Please contact the clinic if you have additional questions.  Thank you.    Sincerely,    Pratik Mariano PA-C